# Patient Record
Sex: MALE | Race: WHITE
[De-identification: names, ages, dates, MRNs, and addresses within clinical notes are randomized per-mention and may not be internally consistent; named-entity substitution may affect disease eponyms.]

---

## 2018-02-22 ENCOUNTER — HOSPITAL ENCOUNTER (OUTPATIENT)
Dept: HOSPITAL 46 - DS | Age: 53
Discharge: HOME | End: 2018-02-22
Attending: SURGERY
Payer: COMMERCIAL

## 2018-02-22 VITALS — HEIGHT: 69 IN | WEIGHT: 187 LBS | BODY MASS INDEX: 27.7 KG/M2

## 2018-02-22 DIAGNOSIS — Z12.11: Primary | ICD-10-CM

## 2018-02-22 DIAGNOSIS — Z98.890: ICD-10-CM

## 2018-02-22 DIAGNOSIS — E78.5: ICD-10-CM

## 2018-02-22 DIAGNOSIS — K21.9: ICD-10-CM

## 2018-02-22 DIAGNOSIS — K64.4: ICD-10-CM

## 2018-02-22 DIAGNOSIS — M51.36: ICD-10-CM

## 2018-02-22 DIAGNOSIS — D12.2: ICD-10-CM

## 2018-02-22 PROCEDURE — 0DBK8ZX EXCISION OF ASCENDING COLON, VIA NATURAL OR ARTIFICIAL OPENING ENDOSCOPIC, DIAGNOSTIC: ICD-10-PCS | Performed by: SURGERY

## 2018-02-22 PROCEDURE — G0500 MOD SEDAT ENDO SERVICE >5YRS: HCPCS

## 2018-02-22 NOTE — NUR
02/22/18 0923 Ubaldo Mayes 0916: PT ARRIVED TO PACU, PT SLEEPING. AWAKENS TO MOVEMENT AND
VOICE. REPORT GIVEN. VITALS STABLE. 02 ON 3L PER NC.

## 2018-02-28 NOTE — OR
[facility]
                                    [address], [place]  [address]
_________________________________________________________________________________________
                                                                 Signed   
 
 
DATE OF OPERATION:
02/22/2018
 
SURGEON:
Sean Denis MD
 
PROCEDURE:
Colonoscopy.
 
PREOPERATIVE DIAGNOSIS:
Screening.
 
POSTOPERATIVE DIAGNOSES:
1. A 5 mm polyp distal right colon.
2. Minimal external hemorrhoids.
3. Internal anal skin tag x1.
 
PROCEDURE:
Colonoscopy with hot biopsy.
 
ESTIMATED BLOOD LOSS:
None.
 
INDICATIONS:
Sharda is a 52-year-old gentleman asked to see me for his initial colonoscopy.  He said
he has no lower GI complaints.  He has no family history of colon cancer or polyps to
his knowledge.  I gave him a pamphlet in the office on colonoscopy and we looked at that
together along with the risk including, but not limited to gas bloating, crampy
abdominal pain, bleeding, perforation, requiring surgery, and missed diagnosis.  We also
discussed the need for IV conscious sedation.  He had expressed understanding and wished
to proceed. 
 
PROCEDURE NOTE:
Sharda was taken into our endoscopy suite and placed in the left lateral decubitus
position.  He was given IV sedation with 10 mg of Versed and 200 mcg of fentanyl to
cover the case.  A digital rectal exam was performed and this showed minimal external
hemorrhoids.  The adult colonoscope was introduced and advanced under direct
visualization of camera.  He required extra sedation and abdominal compression in order
to get the scope all the way into the cecum.  His prep was good.  The scope was then
slowly withdrawn.  He had a small 5 mm polyp in the distal right colon.  We removed this
with the help of hot biopsy forceps.  The rest of the colon and rectum were
unremarkable.  Upon retroflexion of the scope, he did have an internal anal skin tag.
 
    Electronically Signed By: SEAN DENIS MD  02/28/18 0610
_________________________________________________________________________________________
PATIENT NAME:     SHARDA RIDDLE                         
MEDICAL RECORD #: [id number]            OPERATIVE REPORT              
          ACCT #: [id number]  
DATE OF BIRTH:   [birth year]            REPORT #: [id number]      
PHYSICIAN:        SEAN DENIS MD             
PCP:              DANIELA VILLALOBOS MD      
REPORT IS CONFIDENTIAL AND NOT TO BE RELEASED WITHOUT AUTHORIZATION
 
 
                                  [facility]
                                    [address] [facility], [place]  [address]
_________________________________________________________________________________________
                                                                 Signed   
 
 
After this, the gas was suctioned out.  The colonoscope removed.  Sharda tolerated the
procedure quite well. 
 
RECOMMENDATIONS:
Sharda will follow up my office in 7 to 14 days to review his results.
 
 
 
            ________________________________________
            Sean Denis MD 
 
 
ALB/MODL
Job #:  [id number]
DD:  02/22/2018 09:22:47
DT:  02/22/2018 10:27:50
 
cc:            MD Sean Looney MD
 
 
Copies:  DANIELA VILLALOBOS MD, ANDREW L MD
~
 
 
 
 
 
 
 
 
 
 
 
 
 
 
 
 
 
 
    Electronically Signed By: SEAN DENIS MD  02/28/18 0610
_________________________________________________________________________________________
PATIENT NAME:     SHARDA RIDDLE                         
MEDICAL RECORD #: [id number]            OPERATIVE REPORT              
          ACCT #: [id number]  
DATE OF BIRTH:   [birth year]            REPORT #: [id number]      
PHYSICIAN:        SEAN DENIS MD             
PCP:              DANIELA VILLALOBOS MD      
REPORT IS CONFIDENTIAL AND NOT TO BE RELEASED WITHOUT AUTHORIZATION